# Patient Record
(demographics unavailable — no encounter records)

---

## 2020-09-21 NOTE — NUR
Patient discharged with v/s stable. Written and verbal after care instructions 
given and explained to parent/guardian. Parent/Guardian verbalized 
understanding of instructions. Ambulatory with steady gait. All questions 
addressed prior to discharge. ID band removed. Parent/Guardian advised to 
follow up with PMD. Rx of ibuprofen given. Parent/Guardian educated on 
indication of medication including possible reaction and side effects. 
Opportunity to ask questions provided and answered.

## 2020-09-21 NOTE — NUR
7 year old male complains of headache after hitting head against metal object 
yesterday. Pt denies LOC, no n/v/d since event. Pt mom states hes been crying 
in the morning from pain on the back of his head. No visible abrasion noted. Pt 
alert and awake, breathing even and unlabored, skin warm and dry. Bed in lowest 
position, locked, bed rail upx1.



PMH - denies

allergies - nka